# Patient Record
Sex: FEMALE | Race: BLACK OR AFRICAN AMERICAN | Employment: FULL TIME | URBAN - METROPOLITAN AREA
[De-identification: names, ages, dates, MRNs, and addresses within clinical notes are randomized per-mention and may not be internally consistent; named-entity substitution may affect disease eponyms.]

---

## 2024-02-18 ENCOUNTER — HOSPITAL ENCOUNTER (EMERGENCY)
Facility: HOSPITAL | Age: 29
Discharge: HOME | End: 2024-02-18
Attending: EMERGENCY MEDICINE
Payer: OTHER MISCELLANEOUS

## 2024-02-18 VITALS
RESPIRATION RATE: 20 BRPM | SYSTOLIC BLOOD PRESSURE: 109 MMHG | TEMPERATURE: 98.6 F | WEIGHT: 92.37 LBS | DIASTOLIC BLOOD PRESSURE: 56 MMHG | HEART RATE: 85 BPM | OXYGEN SATURATION: 100 %

## 2024-02-18 DIAGNOSIS — M79.604 RIGHT LEG PAIN: Primary | ICD-10-CM

## 2024-02-18 LAB
BILIRUB UR QL STRIP: NEGATIVE
CLARITY UR: CLEAR
COLOR UR: YELLOW
EXT PREGNANCY TEST URINE: NEGATIVE
EXT. CONTROL: NORMAL
GLUCOSE UR STRIP-MCNC: NEGATIVE MG/DL
HGB UR QL STRIP.AUTO: NEGATIVE
KETONES UR STRIP-MCNC: NEGATIVE MG/DL
LEUKOCYTE ESTERASE UR QL STRIP: NEGATIVE
NITRITE UR QL STRIP: NEGATIVE
PH UR STRIP.AUTO: 5.5 [PH] (ref 4.5–8)
PROT UR STRIP-MCNC: NEGATIVE MG/DL
SP GR UR STRIP.AUTO: 1.02 (ref 1–1.03)
UROBILINOGEN UR QL STRIP.AUTO: 0.2 E.U./DL

## 2024-02-18 PROCEDURE — 81003 URINALYSIS AUTO W/O SCOPE: CPT

## 2024-02-18 PROCEDURE — 99283 EMERGENCY DEPT VISIT LOW MDM: CPT

## 2024-02-18 PROCEDURE — 87591 N.GONORRHOEAE DNA AMP PROB: CPT | Performed by: EMERGENCY MEDICINE

## 2024-02-18 PROCEDURE — 87491 CHLMYD TRACH DNA AMP PROBE: CPT | Performed by: EMERGENCY MEDICINE

## 2024-02-18 PROCEDURE — 99284 EMERGENCY DEPT VISIT MOD MDM: CPT | Performed by: EMERGENCY MEDICINE

## 2024-02-18 PROCEDURE — 81025 URINE PREGNANCY TEST: CPT | Performed by: EMERGENCY MEDICINE

## 2024-02-18 RX ORDER — ACETAMINOPHEN 325 MG/1
650 TABLET ORAL ONCE
Status: COMPLETED | OUTPATIENT
Start: 2024-02-18 | End: 2024-02-18

## 2024-02-18 RX ORDER — SENNOSIDES 8.6 MG
650 CAPSULE ORAL EVERY 8 HOURS PRN
Qty: 30 TABLET | Refills: 0 | Status: SHIPPED | OUTPATIENT
Start: 2024-02-18

## 2024-02-18 RX ORDER — IBUPROFEN 600 MG/1
600 TABLET ORAL EVERY 8 HOURS PRN
Qty: 15 TABLET | Refills: 0 | Status: SHIPPED | OUTPATIENT
Start: 2024-02-18

## 2024-02-18 RX ORDER — IBUPROFEN 400 MG/1
400 TABLET ORAL ONCE
Status: COMPLETED | OUTPATIENT
Start: 2024-02-18 | End: 2024-02-18

## 2024-02-18 RX ADMIN — IBUPROFEN 400 MG: 400 TABLET, FILM COATED ORAL at 20:18

## 2024-02-18 RX ADMIN — ACETAMINOPHEN 325MG 650 MG: 325 TABLET ORAL at 20:18

## 2024-02-18 NOTE — Clinical Note
Gale Bundy was seen and treated in our emergency department on 2/18/2024.                Diagnosis:     Gale  may return to work on return date.    She may return on this date: 02/18/2024         If you have any questions or concerns, please don't hesitate to call.      Matheus Dave MD    ______________________________           _______________          _______________  Hospital Representative                              Date                                Time

## 2024-02-18 NOTE — Clinical Note
Gale Bundy was seen and treated in our emergency department on 2/18/2024.                Diagnosis:     Gale  may return to work on return date.    She may return on this date: 02/19/2024         If you have any questions or concerns, please don't hesitate to call.      Matheus Dave MD    ______________________________           _______________          _______________  Hospital Representative                              Date                                Time

## 2024-02-19 LAB
C TRACH DNA SPEC QL NAA+PROBE: NEGATIVE
N GONORRHOEA DNA SPEC QL NAA+PROBE: NEGATIVE

## 2024-02-19 NOTE — ED PROVIDER NOTES
History  Chief Complaint   Patient presents with    Leg Pain     Pt states she was at work 2 days ago when she was hit with a wooden pallet in the right leg. Pt complaining of ongoing right leg/knee pain. Pt states she was given a tetanus shot and x-rays of the right leg that were normal, but she is still having the pain and wants further evaluation.        History provided by:  Patient   used: Yes (iPad # [id number])    Leg Pain  Location:  Leg  Time since incident:  3 days  Injury: no    Leg location:  R lower leg  Pain details:     Quality:  Aching    Radiates to:  Does not radiate    Severity:  Mild    Onset quality:  Gradual    Duration:  3 days    Timing:  Intermittent    Progression:  Waxing and waning  Chronicity:  New  Dislocation: no    Foreign body present:  Unable to specify  Tetanus status:  Unknown  Prior injury to area:  Unable to specify  Relieved by:  Nothing  Worsened by:  Nothing  Ineffective treatments:  None tried  Associated symptoms: no back pain, no decreased ROM, no fatigue, no fever, no muscle weakness, no neck pain and no swelling    Risk factors comment:  None      None       History reviewed. No pertinent past medical history.    History reviewed. No pertinent surgical history.    History reviewed. No pertinent family history.  I have reviewed and agree with the history as documented.    E-Cigarette/Vaping     E-Cigarette/Vaping Substances          Review of Systems   Constitutional:  Negative for chills, fatigue and fever.   HENT:  Negative for facial swelling, sore throat and trouble swallowing.    Eyes:  Negative for pain and visual disturbance.   Respiratory:  Negative for cough, chest tightness and shortness of breath.    Cardiovascular:  Negative for chest pain and leg swelling.   Gastrointestinal:  Negative for abdominal pain, diarrhea, nausea and vomiting.   Genitourinary:  Negative for dysuria and flank pain.   Musculoskeletal:  Negative for back pain, neck pain  and neck stiffness.   Skin:  Negative for pallor and rash.   Allergic/Immunologic: Negative for environmental allergies and immunocompromised state.   Neurological:  Negative for dizziness and headaches.   Hematological:  Negative for adenopathy. Does not bruise/bleed easily.   Psychiatric/Behavioral:  Negative for agitation and behavioral problems.    All other systems reviewed and are negative.      Physical Exam  Physical Exam  Vitals and nursing note reviewed.   Constitutional:       General: She is not in acute distress.     Appearance: She is well-developed.   HENT:      Head: Normocephalic and atraumatic.   Eyes:      Extraocular Movements: Extraocular movements intact.   Cardiovascular:      Rate and Rhythm: Normal rate and regular rhythm.   Pulmonary:      Effort: Pulmonary effort is normal. No respiratory distress.   Abdominal:      Palpations: Abdomen is soft.      Tenderness: There is no abdominal tenderness. There is no guarding or rebound.   Musculoskeletal:         General: Normal range of motion.      Cervical back: Normal range of motion and neck supple.      Comments: Mild superficial bruise to proximal right leg lateral aspect, no swelling, no calf tenderness, no knee swelling, range of movement of right knee, motor/sensory exam intact, neurovascular tact distally.   Skin:     General: Skin is warm and dry.   Neurological:      General: No focal deficit present.      Mental Status: She is alert and oriented to person, place, and time.   Psychiatric:         Mood and Affect: Mood normal.         Behavior: Behavior normal.         Vital Signs  ED Triage Vitals [02/18/24 1919]   Temperature Pulse Respirations Blood Pressure SpO2   98.6 °F (37 °C) 85 20 109/56 100 %      Temp Source Heart Rate Source Patient Position - Orthostatic VS BP Location FiO2 (%)   Oral Monitor Sitting Right arm --      Pain Score       4           Vitals:    02/18/24 1919   BP: 109/56   Pulse: 85   Patient Position -  Orthostatic VS: Sitting         Visual Acuity      ED Medications  Medications   acetaminophen (TYLENOL) tablet 650 mg (650 mg Oral Given 2/18/24 2018)   ibuprofen (MOTRIN) tablet 400 mg (400 mg Oral Given 2/18/24 2018)       Diagnostic Studies  Results Reviewed       Procedure Component Value Units Date/Time    Chlamydia/GC amplified DNA by PCR [[id number]] Collected: 02/18/24 2013    Lab Status: In process Specimen: Urine, Other Updated: 02/18/24 2019    POCT pregnancy, urine [[id number]]  (Normal) Resulted: 02/18/24 2018    Lab Status: Final result Updated: 02/18/24 2018     EXT Preg Test, Ur Negative     Control Valid    Urine Macroscopic, POC [[id number]] Collected: 02/18/24 2015    Lab Status: Final result Specimen: Urine Updated: 02/18/24 2016     Color, UA Yellow     Clarity, UA Clear     pH, UA 5.5     Leukocytes, UA Negative     Nitrite, UA Negative     Protein, UA Negative mg/dl      Glucose, UA Negative mg/dl      Ketones, UA Negative mg/dl      Urobilinogen, UA 0.2 E.U./dl      Bilirubin, UA Negative     Occult Blood, UA Negative     Specific Gravity, UA 1.025    Narrative:      CLINITEK RESULT                   No orders to display              Procedures  Procedures         ED Course  ED Course as of 02/18/24 2239   Sun Feb 18, 2024 2058 Leukocytes, UA: Negative   2058 Nitrite, UA: Negative   2058 Blood, UA: Negative   2058 PREGNANCY TEST URINE: Negative  Urine normal.   2058 Patient not concerned about STD, last time she was active was last Summer.   2209 Patient informed about                               SBIRT 22yo+      Flowsheet Row Most Recent Value   Initial Alcohol Screen: US AUDIT-C     1. How often do you have a drink containing alcohol? 0 Filed at: 02/18/2024 1919   2. How many drinks containing alcohol do you have on a typical day you are drinking?  0 Filed at: 02/18/2024 1919   3a. Male UNDER 65: How often do you have five or more drinks on one occasion? 0 Filed at: 02/18/2024 1919   3b.  FEMALE Any Age, or MALE 65+: How often do you have 4 or more drinks on one occassion? 0 Filed at: 02/18/2024 1919   Audit-C Score 0 Filed at: 02/18/2024 1919   DODIE: How many times in the past year have you...    Used an illegal drug or used a prescription medication for non-medical reasons? Never Filed at: 02/18/2024 1919                      Medical Decision Making  Patient is a 28-year-old female, speaks English Creole,  iPad used for translation, comes in with complaints of right leg pain, patient had a wooden pallet fall on her 3 days back, was seen at [facility], had negative x-rays, no trouble walking.  Patient is well-appearing, no distress, walking freely without difficulty, benign exam findings as above.  Patient reassured, requests work note to go back to work tonight.  Patient also requested to test the urine which was negative for infection, she did mention nonspecific vaginal discharge, GC chlamydia was added, although patient is not recently sexually active according to her.    Problems Addressed:  Right leg pain: acute illness or injury    Amount and/or Complexity of Data Reviewed  Labs: ordered. Decision-making details documented in ED Course.    Risk  OTC drugs.  Prescription drug management.             Disposition  Final diagnoses:   Right leg pain     Time reflects when diagnosis was documented in both MDM as applicable and the Disposition within this note       Time User Action Codes Description Comment    2/18/2024  9:33 PM Matheus Dave Add [M79.604] Right leg pain           ED Disposition       ED Disposition   Discharge    Condition   Stable    Date/Time   Sun Feb 18, 2024 2133    Comment   Gale Bundy discharge to home/self care.                   Follow-up Information       Follow up With Specialties Details Why Contact Info Additional Information    Physical Therapy at [facility] Physical Therapy Schedule an appointment as soon as possible  for a visit   [address]  [facility] [address]  [phone] Physical Therapy at [facility], [address], [place], [place], [address]   [phone]            Discharge Medication List as of 2/18/2024 10:13 PM        START taking these medications    Details   acetaminophen (TYLENOL) 650 mg CR tablet Take 1 tablet (650 mg total) by mouth every 8 (eight) hours as needed for mild pain, Starting Sun 2/18/2024, Print      ibuprofen (MOTRIN) 600 mg tablet Take 1 tablet (600 mg total) by mouth every 8 (eight) hours as needed for moderate pain, Starting Sun 2/18/2024, Print             No discharge procedures on file.    PDMP Review       None            ED Provider  Electronically Signed by             Matheus Dave MD  02/18/24 2149

## 2024-02-20 ENCOUNTER — OCCMED (OUTPATIENT)
Dept: URGENT CARE | Facility: MEDICAL CENTER | Age: 29
End: 2024-02-20
Payer: OTHER MISCELLANEOUS

## 2024-02-20 DIAGNOSIS — Z02.89 ENCOUNTER FOR PHYSICAL EXAMINATION RELATED TO EMPLOYMENT: Primary | ICD-10-CM

## 2024-02-20 PROCEDURE — G0382 LEV 3 HOSP TYPE B ED VISIT: HCPCS

## 2024-02-20 PROCEDURE — 99283 EMERGENCY DEPT VISIT LOW MDM: CPT

## 2024-03-06 ENCOUNTER — OCCMED (OUTPATIENT)
Dept: URGENT CARE | Facility: MEDICAL CENTER | Age: 29
End: 2024-03-06
Payer: OTHER MISCELLANEOUS

## 2024-03-06 DIAGNOSIS — Z02.89 ENCOUNTER FOR PHYSICAL EXAMINATION RELATED TO EMPLOYMENT: Primary | ICD-10-CM

## 2024-03-06 PROCEDURE — 99213 OFFICE O/P EST LOW 20 MIN: CPT
